# Patient Record
Sex: FEMALE | Race: WHITE | NOT HISPANIC OR LATINO | Employment: FULL TIME | URBAN - NONMETROPOLITAN AREA
[De-identification: names, ages, dates, MRNs, and addresses within clinical notes are randomized per-mention and may not be internally consistent; named-entity substitution may affect disease eponyms.]

---

## 2023-01-18 ENCOUNTER — OFFICE VISIT (OUTPATIENT)
Dept: URGENT CARE | Facility: PHYSICIAN GROUP | Age: 38
End: 2023-01-18
Payer: COMMERCIAL

## 2023-01-18 VITALS
DIASTOLIC BLOOD PRESSURE: 80 MMHG | OXYGEN SATURATION: 99 % | HEART RATE: 103 BPM | BODY MASS INDEX: 30.12 KG/M2 | RESPIRATION RATE: 16 BRPM | HEIGHT: 63 IN | WEIGHT: 170 LBS | SYSTOLIC BLOOD PRESSURE: 112 MMHG | TEMPERATURE: 97.6 F

## 2023-01-18 DIAGNOSIS — M62.838 MUSCLE SPASM OF RIGHT SHOULDER: ICD-10-CM

## 2023-01-18 DIAGNOSIS — S49.92XA INJURY OF LEFT SHOULDER, INITIAL ENCOUNTER: ICD-10-CM
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
## 2023-01-18 PROCEDURE — 99203 OFFICE O/P NEW LOW 30 MIN: CPT | Performed by: NURSE PRACTITIONER

## 2023-01-18 RX ORDER — METHYLPREDNISOLONE 4 MG/1
TABLET ORAL
Qty: 21 TABLET | Refills: 0 | Status: SHIPPED | OUTPATIENT
Start: 2023-01-18

## 2023-01-18 RX ORDER — CYCLOBENZAPRINE HCL 5 MG
5 TABLET ORAL 3 TIMES DAILY PRN
Qty: 30 TABLET | Refills: 0 | Status: SHIPPED | OUTPATIENT
Start: 2023-01-18

## 2023-01-18 ASSESSMENT — ENCOUNTER SYMPTOMS
BRUISES/BLEEDS EASILY: 0
WEAKNESS: 0
FEVER: 0
MYALGIAS: 1
FALLS: 1
TINGLING: 0
NECK PAIN: 1
CARDIOVASCULAR NEGATIVE: 1
CHILLS: 0
RESPIRATORY NEGATIVE: 1
SENSORY CHANGE: 0
BACK PAIN: 0

## 2023-01-18 NOTE — LETTER
January 18, 2023       Patient: Ruby Rosas   YOB: 1985   Date of Visit: 1/18/2023         To Whom It May Concern:    In my medical opinion, I recommend that Ruby Rosas be excused from work today through Friday, January 20, 2023 she was evaluated in clinic today.    If you have any questions or concerns, please don't hesitate to call [phone]          Sincerely,          AXEL Tena.  Electronically Signed

## 2023-01-18 NOTE — PROGRESS NOTES
Subjective     Ruby Rosas is a 37 y.o. female who presents with Fall (X2weeks, R arm injury )            Fall  Pertinent negatives include no fever or tingling. States slipped and fell on ice 2 weeks ago and fell on her right shoulder.  Has been experiencing right shoulder pain and decreased range of motion with movement.  Has been using over-the-counter NSAID/Tylenol as well as ice/heat.  Denies weakness in right arm but does have decreased gripping strength with use.  Experiencing right-sided neck stiffness with movements as well.  Admits to heavy lifting recently to 30 to 40 pounds at work which has exacerbated pain in right shoulder.  No previous right shoulder injury or surgeries.    PMH:  has no past medical history on file.  MEDS:   Current Outpatient Medications:     methylPREDNISolone (MEDROL DOSEPAK) 4 MG Tablet Therapy Pack, Follow schedule on package instructions., Disp: 21 Tablet, Rfl: 0    cyclobenzaprine (FLEXERIL) 5 mg tablet, Take 1 Tablet by mouth 3 times a day as needed for Muscle Spasms. Causes drowsiness, do not drive or work while using. Caution with use with other sedating medications., Disp: 30 Tablet, Rfl: 0  ALLERGIES:   Allergies   Allergen Reactions    Penicillins      SURGHX: History reviewed. No pertinent surgical history.  SOCHX:  reports that she has never smoked. She has never used smokeless tobacco. She reports that she does not drink alcohol and does not use drugs.  FH: Family history was reviewed, no pertinent findings to report      Review of Systems   Constitutional:  Negative for chills, fever and malaise/fatigue.   Respiratory: Negative.     Cardiovascular: Negative.    Musculoskeletal:  Positive for falls, joint pain, myalgias and neck pain. Negative for back pain.   Skin:  Negative for itching and rash.   Neurological:  Negative for tingling, sensory change and weakness.   Endo/Heme/Allergies:  Does not bruise/bleed easily.   All other systems reviewed and are  "negative.           Objective     /80   Pulse (!) 103   Temp 36.4 °C (97.6 °F) (Temporal)   Resp 16   Ht 1.6 m (5' 3\")   Wt 77.1 kg (170 lb)   SpO2 99%   BMI 30.11 kg/m²      Physical Exam  Vitals reviewed.   Constitutional:       General: She is not in acute distress.     Appearance: Normal appearance. She is well-developed. She is not ill-appearing, toxic-appearing or diaphoretic.   HENT:      Head: Normocephalic.   Neck:        Comments: Decreased range of motion to right side of neck due to stiffness/pain on right side of posterior cervical region.  Cardiovascular:      Rate and Rhythm: Normal rate.   Pulmonary:      Effort: Pulmonary effort is normal.   Musculoskeletal:      Cervical back: Spasms and tenderness present. No swelling, edema, deformity, erythema, signs of trauma, lacerations, rigidity, torticollis, bony tenderness or crepitus. Pain with movement and muscular tenderness present. No spinous process tenderness. Decreased range of motion.        Back:       Comments: Tenderness on palpation of posterior as well as anterior right shoulder joint but not at AC joint.  Extensive muscle spasm around right shoulder joint.   Skin:     General: Skin is warm and dry.   Neurological:      Mental Status: She is alert and oriented to person, place, and time.   Psychiatric:         Attention and Perception: Attention normal.         Mood and Affect: Mood normal.         Speech: Speech normal.         Behavior: Behavior normal. Behavior is cooperative.                           Assessment & Plan        1. Injury of left shoulder, initial encounter    - methylPREDNISolone (MEDROL DOSEPAK) 4 MG Tablet Therapy Pack; Follow schedule on package instructions.  Dispense: 21 Tablet; Refill: 0  - Referral to establish with [facility]CP  - Referral to Orthopedics    2. Fall due to slipping on ice or snow, initial encounter    - Referral to establish with [facility] PCP  - Referral to Orthopedics    3. Muscle spasm of " right shoulder    - cyclobenzaprine (FLEXERIL) 5 mg tablet; Take 1 Tablet by mouth 3 times a day as needed for Muscle Spasms. Causes drowsiness, do not drive or work while using. Caution with use with other sedating medications.  Dispense: 30 Tablet; Refill: 0  - Referral to establish with [facility] PCP  - Referral to Orthopedics       -Take over the counter NSAID as needed for back discomfort, avoid any Ibuprofen products with oral steroid use, may use Tylenol as needed for additional pain relief  -May use cool compresses for any swelling /throbbing pain and warm compresses for muscle stiffness   -May perform gentle back muscle stretches as tolerated after warm compresses to maintain mobility, avoid abdominal crunches, heavy lifting or repetitive motions  -May use Flexeril as directed when at home only due to drowsiness  -May apply topical analgesics as needed (preferred lidocaine based topical like salon Pas)  -Perform proper body mechanics with lifting, twisting, bending and walking. Ask for assistance with heavy objects as needed   -Monitor for bowel/urination problems, numbness/tingling in extremities, decreased range of della with ambulation difficulty- need re-evaluation  Patient in need of new primary care provider-referral placed at this time for general health exam  May follow-up with orthopedics as needed

## 2023-09-04 ENCOUNTER — OFFICE VISIT (OUTPATIENT)
Dept: URGENT CARE | Facility: PHYSICIAN GROUP | Age: 38
End: 2023-09-04
Payer: COMMERCIAL

## 2023-09-04 VITALS
TEMPERATURE: 97.4 F | SYSTOLIC BLOOD PRESSURE: 110 MMHG | RESPIRATION RATE: 16 BRPM | WEIGHT: 190 LBS | DIASTOLIC BLOOD PRESSURE: 74 MMHG | HEART RATE: 98 BPM | OXYGEN SATURATION: 99 % | BODY MASS INDEX: 34.96 KG/M2 | HEIGHT: 62 IN

## 2023-09-04 DIAGNOSIS — K04.7 TOOTH INFECTION: ICD-10-CM

## 2023-09-04 LAB
FLUAV RNA SPEC QL NAA+PROBE: NEGATIVE
FLUBV RNA SPEC QL NAA+PROBE: NEGATIVE
RSV RNA SPEC QL NAA+PROBE: NEGATIVE
S PYO DNA SPEC NAA+PROBE: NOT DETECTED
SARS-COV-2 RNA RESP QL NAA+PROBE: NEGATIVE

## 2023-09-04 PROCEDURE — 0241U POCT CEPHEID COV-2, FLU A/B, RSV - PCR: CPT | Performed by: FAMILY MEDICINE

## 2023-09-04 PROCEDURE — 3074F SYST BP LT 130 MM HG: CPT | Performed by: FAMILY MEDICINE

## 2023-09-04 PROCEDURE — 3078F DIAST BP <80 MM HG: CPT | Performed by: FAMILY MEDICINE

## 2023-09-04 PROCEDURE — 99213 OFFICE O/P EST LOW 20 MIN: CPT | Performed by: FAMILY MEDICINE

## 2023-09-04 PROCEDURE — 87651 STREP A DNA AMP PROBE: CPT | Performed by: FAMILY MEDICINE

## 2023-09-04 RX ORDER — CLINDAMYCIN HYDROCHLORIDE 300 MG/1
300 CAPSULE ORAL 3 TIMES DAILY
Qty: 21 CAPSULE | Refills: 0 | Status: SHIPPED | OUTPATIENT
Start: 2023-09-04 | End: 2023-09-11

## 2023-09-04 NOTE — PROGRESS NOTES
Chief Complaint   Patient presents with    Oral Swelling     X 1 week top r molar in back with migraine, R ear pressure, R sore throat, congestion, chills.  Pt. Wants a Covid test.           Subjective:      Chief Complaint   Patient presents with    Oral Swelling     X 1 week top r molar in back with migraine, R ear pressure, R sore throat, congestion, chills.  Pt. Wants a Covid test.                Dental Pain   This is a new problem. The current episode started in the past 7 days. The problem occurs constantly (constant, throbbing) over upper rt molar. The problem has been worsening.  + tactile fever      Pertinent negatives include no chills, congestion, fatigue,  , nausea, visual change or vomiting. Chewing aggravates the symptoms. Pt has tried tylenol for the symptoms - no improvement.         Social History     Tobacco Use    Smoking status: Every Day     Types: Cigarettes    Smokeless tobacco: Never   Vaping Use    Vaping Use: Every day    Substances: Nicotine, Flavoring    Devices: Disposable   Substance Use Topics    Alcohol use: Never    Drug use: Never         Current Outpatient Medications on File Prior to Visit   Medication Sig Dispense Refill    cyclobenzaprine (FLEXERIL) 10 mg Tab Take 1 Tablet by mouth 1 time a day as needed for Muscle Spasms. 30 Tablet 1    methylPREDNISolone (MEDROL DOSEPAK) 4 MG Tablet Therapy Pack Follow schedule on package instructions. (Patient not taking: Reported on 9/4/2023) 21 Tablet 0    cyclobenzaprine (FLEXERIL) 5 mg tablet Take 1 Tablet by mouth 3 times a day as needed for Muscle Spasms. Causes drowsiness, do not drive or work while using. Caution with use with other sedating medications. (Patient not taking: Reported on 9/4/2023) 30 Tablet 0     No current facility-administered medications on file prior to visit.         No past medical history on file.              Objective:     /74   Pulse 98   Temp 36.3 °C (97.4 °F) (Temporal)   Resp 16   Ht 1.575 m  "(5' 2\")   Wt 86.2 kg (190 lb)   SpO2 99%     Physical Exam   Constitutional: pt is oriented to person, place, and time. Pt appears well-developed. No distress.   HENT:   Head: Normocephalic and atraumatic.   Mouth/Throat:   Airway patent , no edema  Gingival swelling and tenderness around 2nd   Upper        Right  molar  Eyes: Conjunctivae are normal. Pupils are equal, round, and reactive to light. No scleral icterus.   Cardiovascular: Normal rate and regular rhythm.    Pulmonary/Chest: Effort normal and breath sounds normal. No respiratory distress. Pt has no wheezes.   Neurological: pt is alert and oriented to person, place, and time. No cranial nerve deficit.   Skin: Skin is warm. He is not diaphoretic. No erythema.   Psychiatric:  behavior is normal.   Nursing note and vitals reviewed.         Rapid strep, flu , covid all negative     Assessment/Plan:         1. Tooth infection   PCN allergic    Rx clindamycin      Advised f/u with dentist asap      Differential diagnosis, natural history, supportive care, and indications for immediate follow-up discussed. All questions answered. Patient agrees with the plan of care.     Follow-up as needed if symptoms worsen or fail to improve to PCP, Urgent care or Emergency Room.     I have personally reviewed prior external notes and test results pertinent to today's visit.  I have independently reviewed and interpreted all diagnostics ordered during this urgent care acute visit.      - clindamycin (CLEOCIN) 300 MG Cap; Take 1 Capsule by mouth 3 times a day for 7 days.  Dispense: 21 Capsule; Refill: 0    -       "

## 2024-04-01 ENCOUNTER — APPOINTMENT (OUTPATIENT)
Dept: URGENT CARE | Facility: PHYSICIAN GROUP | Age: 39
End: 2024-04-01
Payer: COMMERCIAL

## 2024-04-01 ENCOUNTER — OCCUPATIONAL MEDICINE (OUTPATIENT)
Dept: URGENT CARE | Facility: PHYSICIAN GROUP | Age: 39
End: 2024-04-01
Payer: COMMERCIAL

## 2024-04-01 VITALS
BODY MASS INDEX: 34.34 KG/M2 | OXYGEN SATURATION: 98 % | HEIGHT: 62 IN | WEIGHT: 186.6 LBS | SYSTOLIC BLOOD PRESSURE: 118 MMHG | HEART RATE: 89 BPM | DIASTOLIC BLOOD PRESSURE: 70 MMHG | RESPIRATION RATE: 14 BRPM | TEMPERATURE: 97.8 F

## 2024-04-01 DIAGNOSIS — T23.162A SUPERFICIAL BURN OF BACK OF LEFT HAND, INITIAL ENCOUNTER: ICD-10-CM

## 2024-04-01 PROCEDURE — 3074F SYST BP LT 130 MM HG: CPT | Performed by: PHYSICIAN ASSISTANT

## 2024-04-01 PROCEDURE — 90715 TDAP VACCINE 7 YRS/> IM: CPT | Performed by: PHYSICIAN ASSISTANT

## 2024-04-01 PROCEDURE — 99213 OFFICE O/P EST LOW 20 MIN: CPT | Mod: 25 | Performed by: PHYSICIAN ASSISTANT

## 2024-04-01 PROCEDURE — 90471 IMMUNIZATION ADMIN: CPT | Performed by: PHYSICIAN ASSISTANT

## 2024-04-01 PROCEDURE — 3078F DIAST BP <80 MM HG: CPT | Performed by: PHYSICIAN ASSISTANT

## 2024-04-01 RX ORDER — CEPHALEXIN 500 MG/1
500 CAPSULE ORAL 4 TIMES DAILY
Qty: 20 CAPSULE | Refills: 0 | Status: SHIPPED | OUTPATIENT
Start: 2024-04-01 | End: 2024-04-06

## 2024-04-01 NOTE — LETTER
[address] [place] [address]  Phone:  [phone] - Fax:  [phone]   [facility] Progress Report and Disability Certification  Date of Service: 4/1/2024   No Show:  No  Date / Time of Next Visit:  Thursday   Claim Information   Patient Name: Shea Rosas  Claim Number:     Employer: [employer] ( [employer]) Date of Injury: 3/26/2024     Insurer / TPA: [employer]  ID / SSN:     Occupation: Shift Lead at [employer]  Diagnosis: The encounter diagnosis was Superficial burn of back of left hand, initial encounter.    Medical Information   Related to Industrial Injury? Yes    Subjective Complaints:  DOI: 3/26/2024.  Superficial burn to the dorsum of the left hand obtained while she was cleaning up grease from a grill.  She has been using basic wound care, topical treatment and burn cream.  Over the last few days she has noticed a slight amount of surrounding erythema.  Interestingly, pain and swelling have significantly improved.  There is no joint pain, open lesions or discharge.  Tetanus out of date.  No previous injury.  No second job.   Objective Findings: Superficial well-healing burn to the dorsum of the left hand at the base of the fourth and fifth digit.  Wound is scabbed over.  A well-demarcated area of surrounding erythema noted but no red streaking.  No tenderness, edema, open lesions or discharge.   Pre-Existing Condition(s): None   Assessment:   Initial Visit    Status: Additional Care Required  Permanent Disability:No    Plan: Medication    Diagnostics:      Comments:       Disability Information   Status: Released to Full Duty    From:     Through:   Restrictions are:     Physical Restrictions   Sitting:    Standing:    Stooping:    Bending:      Squatting:    Walking:    Climbing:    Pushing:      Pulling:    Other:    Reaching Above Shoulder (L):   Reaching Above Shoulder (R):       Reaching Below Shoulder (L):    Reaching  Below Shoulder (R):      Not to exceed Weight Limits   Carrying(hrs):   Weight Limit(lb):   Lifting(hrs):   Weight  Limit(lb):     Comments:      Repetitive Actions   Hands: i.e. Fine Manipulations from Grasping:     Feet: i.e. Operating Foot Controls:     Driving / Operate Machinery:     Health Care Provider’s Original or Electronic Signature  Aric Oviedo P.A.-C. Health Care Provider’s Original or Electronic Signature    Otto Pratt DO MPH     Clinic Name / Location: [address] [address] Clinic Phone Number: Dept: [phone]   Appointment Time: 1:15 Pm Visit Start Time: 2:37 PM   Check-In Time:  1:32 Pm Visit Discharge Time: 2:55 PM    Original-Treating Physician or Chiropractor    Page 2-Insurer/TPA    Page 3-Employer    Page 4-Employee

## 2024-04-01 NOTE — PROGRESS NOTES
"Shira Rosas is a very pleasant 38 y.o. female who presents with Other (New W/C DOI 3/26/24 L hand )      DOI: 3/26/2024.  Superficial burn to the dorsum of the left hand obtained while she was cleaning up grease from a grill.  She has been using basic wound care, topical treatment and burn cream.  Over the last few days she has noticed a slight amount of surrounding erythema.  Interestingly, pain and swelling have significantly improved.  There is no joint pain, open lesions or discharge.  Tetanus out of date.  No previous injury.  No second job.     Other        ROS           Objective     /70   Pulse 89   Temp 36.6 °C (97.8 °F) (Temporal)   Resp 14   Ht 1.575 m (5' 2\")   Wt 84.6 kg (186 lb 9.6 oz)   SpO2 98%   BMI 34.13 kg/m²      Physical Exam    Superficial well-healing burn to the dorsum of the left hand at the base of the fourth and fifth digit.  Wound is scabbed over.  A well-demarcated area of surrounding erythema noted but no red streaking.  No tenderness, edema, open lesions or discharge.                   Assessment & Plan         1. Superficial burn of back of left hand, initial encounter  cephALEXin (KEFLEX) 500 MG Cap    Tdap =>6yo IM        No obvious signs of infection however contingent antibiotic prescription given to start at prediscussed symptoms or changes  Follow-up 3 days  Wound care discussed  Tetanus booster      Return to clinic or go to ED if symptoms worsen or persist. Red flag symptoms and indications for ED discussed at length. Patient voices understanding. All side effects and potential interactions of medication discussed including allergic response, GI upset, sedation, etc.    Please note that this dictation was created using voice recognition software. I have made every reasonable attempt to correct obvious errors, but I expect that there are errors of grammar and possibly content that I did not discover before finalizing the note.          "

## 2024-04-01 NOTE — LETTER
"    EMPLOYEE’S CLAIM FOR COMPENSATION/ REPORT OF INITIAL TREATMENT  FORM C-4  PLEASE TYPE OR PRINT    EMPLOYEE’S CLAIM - PROVIDE ALL INFORMATION REQUESTED   First Name                    DONATE Valdivia Last Name  Ralph Birthdate                    1985                Sex  []M  []F Claim Number (Insurer’s Use Only)     Home Address  [address] Age  38 y.o. 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 (if applicable)  cleaning grease off grill    How did this injury or occupational disease occur? (Be specific and answer in detail. Use additional sheet if necessary)  I was cleaning the grill of bulit up grease and one of the pads was loose and drooping when I passed my hand under it the bottom edge caught on my hand and burnt it.   If you believe that you have an occupational disease, when did you first have knowledge of the disability and its relationship to your employment?  n/a Witnesses to the Accident (if applicable)  Akua Galeas      Nature of Injury or Occupational " Disease  Workers' Compensation  Part(s) of Body Injured or Affected  Hand (L) N/A N/A    I CERTIFY THAT THE ABOVE IS TRUE AND CORRECT TO T HE BEST OF MY KNOWLEDGE AND THAT I HAVE PROVIDED THIS INFORMATION IN ORDER TO OBTAIN THE BENEFITS OF [place], OR [place]).  I HEREBY AUTHORIZE ANY PHYSICIAN, CHIROPRACTOR, SURGEON, PRACTITIONER OR ANY OTHER PERSON, ANY HOSPITAL, INCLUDING [facility] OR [facility], ANY  MEDICAL SERVICE ORGANIZATION, ANY INSURANCE COMPANY, OR OTHER INSTITUTION OR ORGANIZATION TO RELEASE TO EACH OTHER, ANY MEDICAL OR OTHER INFORMATION, INCLUDING BENEFITS PAID OR PAYABLE, PERTINENT TO THIS INJURY OR DISEASE, EXCEPT INFORMATION RELATIVE TO DIAGNOSIS, TREATMENT AND/OR COUNSELING FOR AIDS, PSYCHOLOGICAL CONDITIONS, ALCOHOL OR CONTROLLED SUBSTANCES, FOR WHICH I MUST GIVE SPECIFIC AUTHORIZATION.  A PHOTOSTAT OF THIS AUTHORIZATION SHALL BE VALID AS THE ORIGINAL.     Date 04/01/2024   [facility] Employee’s Original or  *Electronic Signature   THIS REPORT MUST BE COMPLETED AND MAILED WITHIN 3 WORKING DAYS OF TREATMENT   [facility]    Name of Facility  [place]   Date 4/1/2024 Diagnosis and Description of Injury or Occupational Disease  (T23.162A) Superficial burn of back of left hand, initial encounter  The encounter diagnosis was Superficial burn of back of left hand, initial encounter. Is there evidence that the injured employee was under the influence of alcohol and/or another controlled substance at the time of accident?  []No  [] Yes (if yes, please explain)   Hour 2:37 PM  No   Treatment: Superficial burn.  No obvious signs of infection.  Contingent antibiotic prescription given to start at prediscussed symptoms.  Wound care discussed follow-up in 3 days  Tetanus booster    Have you advised the patient to remain off work five days or more?   [] Yes  Indicate dates: From   To    []No If no, is the injured employee capable of: [] full duty [] modified duty                                                             Yes     If modified duty, specify any limitations / restrictions:                                                                                                                                                                                                                                                                                                                                                                                                                  X-Ray Findings:      From information given by the employee, together with medical evidence, can you directly connect this injury or occupational disease as job incurred?  []Yes   [] No Yes    Is additional medical care by a physician indicated? []Yes [] No  Yes    Do you know of any previous injury or disease contributing to this condition or occupational disease? []Yes [] No (Explain if yes)                          No   Date  4/1/2024 Print Health Care Provider’s Name  Dee Oviedo P.A.-C. I certify that the employer’s copy of  this form was delivered to the employer on:   Address  [address] INSURER'S USE ONLY                       [place] [facility]  [address] Provider’s Tax ID Number  [id number]   Telephone  Dept: [phone]    Health Care Provider’s Original or Electronic Signature  e-DEE Tolentino P.A.-C. Degree (MD,DO, DC,PA-C,APRN)    Choose (if applicable)      ORIGINAL - TREATING HEALTHCARE PROVIDER PAGE 2 - INSURER/TPA PAGE 3 - EMPLOYER PAGE 4 - EMPLOYEE             Form C-4 (rev.08/23)        BRIEF DESCRIPTION OF RIGHTS AND BENEFITS  (Pursuant to [place] 616C.050)    Notice of Injury or Occupational Disease (Incident Report Form C-1): If an injury or occupational disease (OD) arises out of and in the course of employment, you must provide written  "notice to your employer as soon as practicable, but no later than 7 days after the accident or OD. Your employer shall maintain a sufficient supply of the required forms.    Claim for Compensation (Form C-4): If medical treatment is sought, the form C-4 is available at the place of initial treatment. A completed \"Claim for Compensation\" (Form C-4) must be filed within 90 days after an accident or OD. The treating physician or chiropractor must, within 3 working days after treatment, complete and mail to the employer, the employer's insurer and third-party , the Claim for Compensation.    Medical Treatment: If you require medical treatment for your on-the-job injury or OD, you may be required to select a physician or chiropractor from a list provided by your workers’ compensation insurer, if it has contracted with an Organization for Managed Care (MCO) or Preferred Provider Organization (PPO) or providers of health care. If your employer has not entered into a contract with an MCO or PPO, you may select a physician or chiropractor from the Panel of Physicians and Chiropractors. Any medical costs related to your industrial injury or OD will be paid by your insurer.    Temporary Total Disability (TTD): If your doctor has certified that you are unable to work for a period of at least 5 consecutive days, or 5 cumulative days in a 20-day period, or places restrictions on you that your employer does not accommodate, you may be entitled to TTD compensation.    Temporary Partial Disability (TPD): If the wage you receive upon reemployment is less than the compensation for TTD to which you are entitled, the insurer may be required to pay you TPD compensation to make up the difference. TPD can only be paid for a maximum of 24 months.    Permanent Partial Disability (PPD): When your medical condition is stable and there is an indication of a PPD as a result of your injury or OD, within 30 days, your insurer must " arrange for an evaluation by a rating physician or chiropractor to determine the degree of your PPD. The amount of your PPD award depends on the date of injury, the results of the PPD evaluation, your age and wage.    Permanent Total Disability (PTD): If you are medically certified by a treating physician or chiropractor as permanently and totally disabled and have been granted a PTD status by your insurer, you are entitled to receive monthly benefits not to exceed 66 2/3% of your average monthly wage. The amount of your PTD payments is subject to reduction if you previously received a lump-sum PPD award.    Vocational Rehabilitation Services: You may be eligible for vocational rehabilitation services if you are unable to return to the job due to a permanent physical impairment or permanent restrictions as a result of your injury or occupational disease.    Transportation and Per Mely Reimbursement: You may be eligible for travel expenses and per mely associated with medical treatment.    Reopening: You may be able to reopen your claim if your condition worsens after claim closure.     Appeal Process: If you disagree with a written determination issued by the insurer or the insurer does not respond to your request, you may appeal to the Department of Administration, , by following the instructions contained in your determination letter. You must appeal the determination within 70 days from the date of the determination letter at [address], [address], [place], [place] [address], or [address] [facility] [address], [place] [address]. If you disagree with the  decision, you may appeal to the Department of Administration, . You must file your appeal within 30 days from the date of the  decision letter at [address], [address], [place], [place] [address], or [address], [place], [place] [address]. If you disagree  with a decision of an , you may file a petition for judicial review with the District Court. You must do so within 30 days of the Appeal Officer’s decision. You may be represented by an  at your own expense or you may contact the [facility] for possible representation.    [place]  for Injured Workers (NAIW): If you disagree with a  decision, you may request that NAIW represent you without charge at an  Hearing. For information regarding denial of benefits, you may contact the [facility] at: [address], [address], [place] [address], [phone], or [address] [address], [phone]    To File a Complaint with the Division: If you wish to file a complaint with the  of the Division of Industrial Relations (DIR),  please contact the Workers’ Compensation Section, [address], [address], [place], [place] [address], telephone [phone], or [address], [address], [place], [place] [address], telephone [phone].    For assistance with Workers’ Compensation Issues: You may contact the [facility] Central Office for Consumer Health Assistance, [address], [address], [place], [place] [address], Toll Free 1-263.656.5168, Web site: http://[facility].[place].gov/Programs/BERNARD E-mail: bernard@[facility].nv.[facility]              __________________________________________________________________                                    04/01/2024            Employee Name / Signature                                                                                                                            Date                                                                                                                                                                                                                              D-2 (rev. 10/20)

## 2024-04-04 ENCOUNTER — OCCUPATIONAL MEDICINE (OUTPATIENT)
Dept: URGENT CARE | Facility: PHYSICIAN GROUP | Age: 39
End: 2024-04-04
Payer: COMMERCIAL

## 2024-04-04 VITALS
HEART RATE: 82 BPM | HEIGHT: 63 IN | OXYGEN SATURATION: 96 % | TEMPERATURE: 98.5 F | RESPIRATION RATE: 18 BRPM | SYSTOLIC BLOOD PRESSURE: 112 MMHG | DIASTOLIC BLOOD PRESSURE: 70 MMHG | WEIGHT: 188 LBS | BODY MASS INDEX: 33.31 KG/M2

## 2024-04-04 DIAGNOSIS — T23.162A SUPERFICIAL BURN OF BACK OF LEFT HAND, INITIAL ENCOUNTER: ICD-10-CM

## 2024-04-04 PROCEDURE — 3074F SYST BP LT 130 MM HG: CPT | Performed by: FAMILY MEDICINE

## 2024-04-04 PROCEDURE — 99213 OFFICE O/P EST LOW 20 MIN: CPT | Performed by: FAMILY MEDICINE

## 2024-04-04 PROCEDURE — 3078F DIAST BP <80 MM HG: CPT | Performed by: FAMILY MEDICINE

## 2024-04-04 PROCEDURE — 1125F AMNT PAIN NOTED PAIN PRSNT: CPT | Performed by: FAMILY MEDICINE

## 2024-04-04 ASSESSMENT — PAIN SCALES - GENERAL: PAINLEVEL: 7=MODERATE-SEVERE PAIN

## 2024-04-04 NOTE — PROGRESS NOTES
"SUBJECTIVE      Chief Complaint   Patient presents with    Other     WC F/V burned L hand DOI 3/26/24             DOI:   26 MARCH      S/p partial thickness burn dorsum left hand      Denies fever or d/c      Has been applying polysporin daily             History   Substance Use Topics    Smoking status: Never Smoker     Smokeless tobacco: No     Alcohol Use: No      Past medical history was unremarkable and not pertinent to current issue      Family History   Problem Relation Age of Onset    Diabetes Father          Review of Systems   Constitutional: Negative for fever and fatigue.   HENT: Negative for congestion.    Respiratory: Negative for cough and shortness of breath.    Cardiovascular: Negative for chest pain.   Gastrointestinal: Negative for abdominal pain.   Skin: Positive for rash.   Neurological: Negative for dizziness.   All other systems reviewed and are negative.         Objective:     /70   Pulse 82   Temp 36.9 °C (98.5 °F) (Temporal)   Resp 18   Ht 1.6 m (5' 3\")   Wt 85.3 kg (188 lb)   SpO2 96%       Physical Exam   Constitutional: pt is oriented to person, place, and time. Pt appears well-developed and well-nourished. No distress.   HENT:   Head: Normocephalic and atraumatic.   Eyes: Conjunctivae are normal. No scleral icterus.   Cardiovascular: Normal rate and regular rhythm.    Pulmonary/Chest: Effort normal and breath sounds normal. No respiratory distress.        Neurological: pt is alert and oriented to person, place, and time. No cranial nerve deficit.   Skin: Skin is warm. Pt is not diaphoretic.      Area of erythema, warmth, tender to touch over     Left hand:   full AROM.   Neurovasc intact.     There is a 3cm burn eschar without discharge or surrounding erythema.         Nursing note and vitals reviewed.              Assessment/Plan:      1. Superficial burn of back of left hand     No signs of wound infection  Continue polysporin daily    Cleared for full duty    F/u one " wk

## 2024-04-04 NOTE — LETTER
[address] [place] [address]  Phone:  [phone] - Fax:  [phone]   [facility] Progress Report and Disability Certification  Date of Service: 4/4/2024   No Show:  No  Date / Time of Next Visit:     Claim Information   Patient Name: Shea Rosas  Claim Number:     Employer:  [employer]  Date of Injury: 3/26/2024     Insurer / TPA: [facility]  ID / SSN:     Occupation: Shift Lead at [employer]  Diagnosis: The encounter diagnosis was Superficial burn of back of left hand, initial encounter.    Medical Information   Related to Industrial Injury? Yes    Subjective Complaints:        DOI:   26 MARCH      S/p partial thickness burn dorsum left hand      Denies fever or d/c      Has been applying polysporin daily       Objective Findings: Left hand:   full AROM.   Neurovasc intact.     There is a 3cm burn eschar without discharge or surrounding erythema.          Pre-Existing Condition(s):     Assessment:   Condition Improved    Status: Additional Care Required  Permanent Disability:No    Plan:      Diagnostics:      Comments:       Disability Information   Status: Released to Full Duty    From:     Through:   Restrictions are:     Physical Restrictions   Sitting:    Standing:    Stooping:    Bending:      Squatting:    Walking:    Climbing:    Pushing:      Pulling:    Other:    Reaching Above Shoulder (L):   Reaching Above Shoulder (R):       Reaching Below Shoulder (L):    Reaching Below Shoulder (R):      Not to exceed Weight Limits   Carrying(hrs):   Weight Limit(lb):   Lifting(hrs):   Weight  Limit(lb):     Comments: Superficial burn of back of left hand     No signs of wound infection  Continue polysporin daily    Cleared for full duty    F/u one wk    Repetitive Actions   Hands: i.e. Fine Manipulations from Grasping:     Feet: i.e. Operating Foot Controls:     Driving / Operate Machinery:     Health Care Provider’s Original or  Electronic Signature  Jabier Davis M.D. Health Care Provider’s Original or Electronic Signature    Otto Pratt DO MPH     Clinic Name / Location: [address] [address] Clinic Phone Number: Dept: [phone]   Appointment Time: 10:00 Am Visit Start Time: 10:19 AM   Check-In Time:  9:43 Am Visit Discharge Time: 10:34 AM    Original-Treating Physician or Chiropractor    Page 2-Insurer/TPA    Page 3-Employer    Page 4-Employee

## 2024-04-11 ENCOUNTER — APPOINTMENT (OUTPATIENT)
Dept: URGENT CARE | Facility: PHYSICIAN GROUP | Age: 39
End: 2024-04-11
Payer: COMMERCIAL

## 2024-04-12 ENCOUNTER — OCCUPATIONAL MEDICINE (OUTPATIENT)
Dept: URGENT CARE | Facility: PHYSICIAN GROUP | Age: 39
End: 2024-04-12
Payer: COMMERCIAL

## 2024-04-12 VITALS
OXYGEN SATURATION: 96 % | HEART RATE: 81 BPM | SYSTOLIC BLOOD PRESSURE: 116 MMHG | DIASTOLIC BLOOD PRESSURE: 64 MMHG | BODY MASS INDEX: 33.95 KG/M2 | HEIGHT: 63 IN | RESPIRATION RATE: 20 BRPM | WEIGHT: 191.6 LBS | TEMPERATURE: 97.3 F

## 2024-04-12 DIAGNOSIS — T23.102D: ICD-10-CM

## 2024-04-12 PROCEDURE — 3074F SYST BP LT 130 MM HG: CPT | Performed by: NURSE PRACTITIONER

## 2024-04-12 PROCEDURE — 3078F DIAST BP <80 MM HG: CPT | Performed by: NURSE PRACTITIONER

## 2024-04-12 PROCEDURE — 99213 OFFICE O/P EST LOW 20 MIN: CPT | Performed by: NURSE PRACTITIONER

## 2024-04-12 NOTE — LETTER
PHYSICIAN’S AND CHIROPRACTIC PHYSICIAN'S                       PROGRESS REPORT  CERTIFICATION OF DISABILITY Claim Number:        Social Security Number:     Patient’s Name:Shea Rosas Date of Injury:  3/26/2024     Employer:   [employer]  Name of O (if applicable)   Patient’s Job Description/Occupation:  Shift Lead at [employer]    Previous Injuries/Diseases/Surgeries Contributing to the Condition:      Diagnosis:  The encounter diagnosis was Superficial burn of left hand, subsequent encounter.   Related to the Industrial Injury? Explain:  Yes    Objective Medical Findings:     Comments: Scab in place to left dorsal hand.  Negative for swelling or surrounding erythema.  No active drainage or bleeding.       []  None - Discharged                         Stable     [x]  Yes     []  No                           Ratable     []  Yes     []  No   [x]  Generally Improved                        []  Condition Worsened                              []  Condition Same         May Have Suffered a Permanent Disability     []  Yes     []  No   Treatment Plan:       [] No Change in Therapy                    [] PT/OT Prescribed                   [] Medication May be Used While Working    [] Case Management                          [] PT/OT Discontinued  []  Consultation    [] Further Diagnostic Studies    []  Prescription(s)                        [] Released to FULL DUTY /No Restrictions on (Date):                                                                                           [] Certified TOTALLY TEMPORARILY DISABLED (Indicate Dates): From:                       To:                               [] Released to RESTRICTED/Modified Duty on (Date): From:                              To:                                                                   Restrictions Are:     []  Permanent[]  Temporary   [] No Sitting                   []  No Standing          []  No Pulling              []  Other:                                              [] No Bending at Waist  []  No Stooping          []  No Lifting                                                                            [] No Carrying                []  No Walking           []  Lifting Restricted to (lbs.):   [] No Pushing                 []  No Climbing         []  No Reaching Above Shoulders   Date of Next Visit:   Date of this Exam:  4/12/2024 Physician/Chiropractic Physician Name: JUNG Griffith Physician/Chiropractic Physician Signature:   Otto Pratt DO MPH   D-39 (Rev. 2/24)

## 2024-04-12 NOTE — PROGRESS NOTES
"Subjective:     Shea Rosas is a 38 y.o. female who presents for Follow-Up (Left hand WC follow up - burn, pt sts it is healing well )      HPI  DOI: 3/26/2024.  Superficial burn to the dorsum of the left hand obtained while she was cleaning up grease from a grill.  She has been using basic wound care, topical treatment and burn cream.  Over the last few days she has noticed a slight amount of surrounding erythema.  Interestingly, pain and swelling have significantly improved.  There is no joint pain, open lesions or discharge.  Tetanus out of date.  No previous injury.  No second job.     Follow-up visit #1.  4/4/2024.  S/p partial thickness burn dorsum left hand  Denies fever or d/c  Has been applying polysporin daily    Follow-up visit #2.  4/12/2024.  Wound is healing well.  Patient denies any pain at this time.  Scab remains in place.  No work restrictions needed at this time.    ROS    PMH: No past medical history on file.  ALLERGIES:   Allergies   Allergen Reactions    Penicillins      SURGHX: No past surgical history on file.  SOCHX:   Social History     Socioeconomic History    Marital status:    Tobacco Use    Smoking status: Every Day     Current packs/day: 0.50     Types: Cigarettes    Smokeless tobacco: Never   Vaping Use    Vaping Use: Every day    Substances: Nicotine, Flavoring    Devices: Disposable   Substance and Sexual Activity    Alcohol use: Never    Drug use: Yes     Types: Marijuana     Comment: occ     FH:   Family History   Problem Relation Age of Onset    Diabetes Father          Objective:   /64 (BP Location: Left arm, Patient Position: Sitting, BP Cuff Size: Adult)   Pulse 81   Temp 36.3 °C (97.3 °F) (Temporal)   Resp 20   Ht 1.6 m (5' 3\")   Wt 86.9 kg (191 lb 9.6 oz)   SpO2 96%   BMI 33.94 kg/m²     Physical Exam  Vitals and nursing note reviewed.   Constitutional:       General: She is not in acute distress.     Appearance: Normal appearance. She is normal " weight. She is not ill-appearing or toxic-appearing.   HENT:      Head: Normocephalic.      Right Ear: External ear normal.      Left Ear: External ear normal.      Nose: No congestion or rhinorrhea.      Mouth/Throat:      Pharynx: No oropharyngeal exudate or posterior oropharyngeal erythema.   Eyes:      General:         Right eye: No discharge.         Left eye: No discharge.      Pupils: Pupils are equal, round, and reactive to light.   Pulmonary:      Effort: Pulmonary effort is normal.   Abdominal:      General: Abdomen is flat.   Musculoskeletal:         General: Normal range of motion.      Cervical back: Normal range of motion and neck supple.   Skin:     General: Skin is dry.      Comments: Scab in place to left dorsal hand.  Negative for swelling or surrounding erythema.  No active drainage or bleeding.   Neurological:      General: No focal deficit present.      Mental Status: She is alert and oriented to person, place, and time. Mental status is at baseline.   Psychiatric:         Mood and Affect: Mood normal.         Behavior: Behavior normal.         Thought Content: Thought content normal.         Judgment: Judgment normal.         Assessment/Plan:   Assessment    1. Superficial burn of left hand, subsequent encounter          No work restrictions needed at this time.  Patient to follow-up in 7 days for further evaluation of wound healing.  She may return sooner for signs and symptoms of infection.

## 2024-04-19 ENCOUNTER — OCCUPATIONAL MEDICINE (OUTPATIENT)
Dept: URGENT CARE | Facility: PHYSICIAN GROUP | Age: 39
End: 2024-04-19
Payer: COMMERCIAL

## 2024-04-19 VITALS
HEIGHT: 63 IN | BODY MASS INDEX: 33.84 KG/M2 | OXYGEN SATURATION: 98 % | SYSTOLIC BLOOD PRESSURE: 114 MMHG | WEIGHT: 191 LBS | DIASTOLIC BLOOD PRESSURE: 80 MMHG | HEART RATE: 96 BPM | TEMPERATURE: 98 F | RESPIRATION RATE: 14 BRPM

## 2024-04-19 DIAGNOSIS — T23.102D: ICD-10-CM

## 2024-04-19 PROCEDURE — 3074F SYST BP LT 130 MM HG: CPT | Performed by: FAMILY MEDICINE

## 2024-04-19 PROCEDURE — 99213 OFFICE O/P EST LOW 20 MIN: CPT | Performed by: FAMILY MEDICINE

## 2024-04-19 PROCEDURE — 3079F DIAST BP 80-89 MM HG: CPT | Performed by: FAMILY MEDICINE

## 2024-04-19 NOTE — LETTER
PHYSICIAN’S AND CHIROPRACTIC PHYSICIAN'S                       PROGRESS REPORT  CERTIFICATION OF DISABILITY Claim Number:        Social Security Number:     Patient’s Name:Shea Rosas Date of Injury:  3/26/2024     Employer:   [employer]  Name of MCO (if applicable)   Patient’s Job Description/Occupation:  Shift Lead at [employer]    Previous Injuries/Diseases/Surgeries Contributing to the Condition:      Diagnosis:  (T23.102D) Superficial burn of left hand, subsequent encounterThe encounter diagnosis was Superficial burn of left hand, subsequent encounter.   Related to the Industrial Injury? Yes   Explain:[unfilled]   Objective Medical Findings: Appropriately healing scab to the back of her left hand.  No surrounding erythema or edema.  Equal strength and sensation to hands bilaterally.    []  None - Discharged                         Stable     []  Yes     []  No                           Ratable     []  Yes     []  No   []  Generally Improved                        []  Condition Worsened                              []  Condition Same         May Have Suffered a Permanent Disability     []  Yes     []  No   Treatment Plan: [unfilled]     [] No Change in Therapy                    [] PT/OT Prescribed                   [] Medication May be Used While Working    [] Case Management                          [] PT/OT Discontinued  []  Consultation    [] Further Diagnostic Studies    []  Prescription(s)                        [x] Released to FULL DUTY /No Restrictions on (Date):                                                                                           [] Certified TOTALLY TEMPORARILY DISABLED (Indicate Dates): From:                       To:                               [] Released to RESTRICTED/Modified Duty on (Date): From:                              To:                                                                   Restrictions Are:      []  Permanent[x]  Temporary   [] No Sitting                   []  No Standing          []  No Pulling             []  Other:                                              [] No Bending at Waist  []  No Stooping          []  No Lifting                                                                            [] No Carrying                []  No Walking           []  Lifting Restricted to (lbs.):   [] No Pushing                 []  No Climbing         []  No Reaching Above Shoulders   Date of Next Visit: 5/3/2024 Date of this Exam:  4/19/2024 Physician/Chiropractic Physician Name: Oneyda Bermudez M.D. Physician/Chiropractic Physician Signature:   Otto Pratt DO MPH   D-39 (Rev. 2/24)

## 2024-04-19 NOTE — PROGRESS NOTES
"  Subjective:     38 y.o. female presents for Work-Related Injury ( [facility] DOI: 03-26-24 L HAND INJURY)      DOI: 3/26/2024  ABRAN: Superficial burn to the dorsum of the left hand that she obtained while she was cleaning up grease from a grill.  She is right-hand dominant.  She denies prior left hand injury.  No secondary employment.    Visit #4 today: Patient reports improvement in symptoms since prior visit, estimates she is 95% of her baseline. She is no longer experiencing any pain to her right hand. She does still have the scab. She is tolerating full duty well.     PMH:   No pertinent past medical history to this problem  MEDS:  Medications were reviewed in EMR  ALLERGIES:  Allergies were reviewed in EMR  FH:   No pertinent family history to this problem     Objective:     /80   Pulse 96   Temp 36.7 °C (98 °F) (Temporal)   Resp 14   Ht 1.6 m (5' 3\")   Wt 86.6 kg (191 lb)   SpO2 98%   BMI 33.83 kg/m²     Appropriately healing scab to the back of her left hand.  No surrounding erythema or edema.  Equal strength and sensation to hands bilaterally.    Assessment/Plan:     1. Superficial burn of left hand, subsequent encounter    Released to Full Duty FROM 4/19/2024 TO 5/3/2024     -Continue full duty  -Tylenol and ibuprofen as needed    Differential diagnosis, natural history, supportive care, and indications for immediate follow-up discussed.  "

## 2024-05-06 ENCOUNTER — OCCUPATIONAL MEDICINE (OUTPATIENT)
Dept: URGENT CARE | Facility: PHYSICIAN GROUP | Age: 39
End: 2024-05-06
Payer: COMMERCIAL

## 2024-05-06 VITALS
SYSTOLIC BLOOD PRESSURE: 110 MMHG | TEMPERATURE: 97.7 F | HEIGHT: 63 IN | OXYGEN SATURATION: 100 % | WEIGHT: 191 LBS | BODY MASS INDEX: 33.84 KG/M2 | DIASTOLIC BLOOD PRESSURE: 68 MMHG | RESPIRATION RATE: 16 BRPM | HEART RATE: 102 BPM

## 2024-05-06 DIAGNOSIS — T23.102D: ICD-10-CM

## 2024-05-06 PROCEDURE — 3078F DIAST BP <80 MM HG: CPT | Performed by: NURSE PRACTITIONER

## 2024-05-06 PROCEDURE — 3074F SYST BP LT 130 MM HG: CPT | Performed by: NURSE PRACTITIONER

## 2024-05-06 PROCEDURE — 99213 OFFICE O/P EST LOW 20 MIN: CPT | Performed by: NURSE PRACTITIONER

## 2024-05-07 NOTE — PROGRESS NOTES
Subjective:     Shea Rosas is a 39 y.o. female who presents for Work-Related Injury ( [facility] DOI: 03-26-24 L HAND INJURY)      HPI  HPI  DOI: 3/26/2024.  Superficial burn to the dorsum of the left hand obtained while she was cleaning up grease from a grill.  She has been using basic wound care, topical treatment and burn cream.  Over the last few days she has noticed a slight amount of surrounding erythema.  Interestingly, pain and swelling have significantly improved.  There is no joint pain, open lesions or discharge.  Tetanus out of date.  No previous injury.  No second job.      Follow-up visit #1.  4/4/2024.  S/p partial thickness burn dorsum left hand  Denies fever or d/c  Has been applying polysporin daily     Follow-up visit #2.  4/12/2024.  Wound is healing well.  Patient denies any pain at this time.  Scab remains in place.  No work restrictions needed at this time.     Visit #4 today: Patient reports improvement in symptoms since prior visit, estimates she is 95% of her baseline. She is no longer experiencing any pain to her right hand. She does still have the scab. She is tolerating full duty well.     Visit 5: Sharon is a very pleasant 39-year-old female presents to urgent care today after suffering a burn of her left hand on 3/26/2024.  Her symptoms have fully resolved at this time.    ROS    PMH: No past medical history on file.  ALLERGIES:   Allergies   Allergen Reactions    Penicillins      SURGHX: No past surgical history on file.  SOCHX:   Social History     Socioeconomic History    Marital status:    Tobacco Use    Smoking status: Every Day     Current packs/day: 0.50     Types: Cigarettes    Smokeless tobacco: Never   Vaping Use    Vaping Use: Every day    Substances: Nicotine, Flavoring    Devices: Disposable   Substance and Sexual Activity    Alcohol use: Never    Drug use: Yes     Types: Marijuana     Comment: occ     FH:   Family History   Problem Relation Age of Onset     "Diabetes Father          Objective:   /68   Pulse (!) 102   Temp 36.5 °C (97.7 °F) (Temporal)   Resp 16   Ht 1.6 m (5' 3\")   Wt 86.6 kg (191 lb)   SpO2 100%   BMI 33.83 kg/m²     Physical Exam  Vitals and nursing note reviewed.   Constitutional:       General: She is not in acute distress.     Appearance: Normal appearance. She is not ill-appearing.   HENT:      Head: Normocephalic and atraumatic.      Right Ear: External ear normal.      Left Ear: External ear normal.      Nose: No congestion or rhinorrhea.      Mouth/Throat:      Mouth: Mucous membranes are moist.   Eyes:      Extraocular Movements: Extraocular movements intact.      Pupils: Pupils are equal, round, and reactive to light.   Cardiovascular:      Rate and Rhythm: Normal rate and regular rhythm.      Pulses: Normal pulses.      Heart sounds: Normal heart sounds.   Pulmonary:      Effort: Pulmonary effort is normal.      Breath sounds: Normal breath sounds.   Abdominal:      General: Abdomen is flat. Bowel sounds are normal.      Palpations: Abdomen is soft.   Musculoskeletal:         General: Normal range of motion.      Cervical back: Normal range of motion and neck supple.   Skin:     General: Skin is warm and dry.      Capillary Refill: Capillary refill takes less than 2 seconds.      Comments: Scarring in place to dorsum of left hand where burn occurred.  No active pain, changes in range of motion or open lesions.   Neurological:      General: No focal deficit present.      Mental Status: She is alert and oriented to person, place, and time. Mental status is at baseline.   Psychiatric:         Mood and Affect: Mood normal.         Behavior: Behavior normal.         Thought Content: Thought content normal.         Judgment: Judgment normal.         Assessment/Plan:   Assessment    1. Superficial burn of left hand, subsequent encounter          Patient cleared to return to work full max duty as symptoms have resolved at this time.  I " did educate her to apply sunscreen to burn to prevent excessive scarring.  She is in agreement with plan of care.  D39 paperwork filled out by hand.

## 2024-12-18 ENCOUNTER — OFFICE VISIT (OUTPATIENT)
Dept: URGENT CARE | Facility: PHYSICIAN GROUP | Age: 39
End: 2024-12-18
Payer: COMMERCIAL

## 2024-12-18 VITALS
TEMPERATURE: 97.7 F | HEIGHT: 62 IN | SYSTOLIC BLOOD PRESSURE: 116 MMHG | BODY MASS INDEX: 33.09 KG/M2 | HEART RATE: 98 BPM | DIASTOLIC BLOOD PRESSURE: 60 MMHG | RESPIRATION RATE: 20 BRPM | OXYGEN SATURATION: 99 % | WEIGHT: 179.8 LBS

## 2024-12-18 DIAGNOSIS — J10.1 INFLUENZA A: ICD-10-CM

## 2024-12-18 LAB
FLUAV RNA SPEC QL NAA+PROBE: POSITIVE
FLUBV RNA SPEC QL NAA+PROBE: NEGATIVE
RSV RNA SPEC QL NAA+PROBE: NEGATIVE
S PYO DNA SPEC NAA+PROBE: NOT DETECTED
SARS-COV-2 RNA RESP QL NAA+PROBE: NEGATIVE

## 2024-12-18 PROCEDURE — 0241U POCT CEPHEID COV-2, FLU A/B, RSV - PCR: CPT | Performed by: FAMILY MEDICINE

## 2024-12-18 PROCEDURE — 99214 OFFICE O/P EST MOD 30 MIN: CPT | Performed by: FAMILY MEDICINE

## 2024-12-18 PROCEDURE — 3074F SYST BP LT 130 MM HG: CPT | Performed by: FAMILY MEDICINE

## 2024-12-18 PROCEDURE — 3078F DIAST BP <80 MM HG: CPT | Performed by: FAMILY MEDICINE

## 2024-12-18 PROCEDURE — 87651 STREP A DNA AMP PROBE: CPT | Performed by: FAMILY MEDICINE

## 2024-12-18 RX ORDER — OSELTAMIVIR PHOSPHATE 75 MG/1
75 CAPSULE ORAL 2 TIMES DAILY
Qty: 10 CAPSULE | Refills: 0 | Status: SHIPPED | OUTPATIENT
Start: 2024-12-18 | End: 2024-12-23

## 2024-12-18 NOTE — PROGRESS NOTES
"Chief Complaint   Patient presents with    Cough     X 3 days     Fever     102.3f last night     Pharyngitis    Nasal Congestion    Body Aches         Cough  This is a new problem. The current episode started yesterday. The problem has been unchanged. The problem occurs constantly. The cough is dry. Associated symptoms include : fatigue, headaches, chills, muscle aches, fever. Pertinent negatives include no   nausea, vomiting, diarrhea, sweats, weight loss or wheezing. Nothing aggravates the symptoms.  Patient has tried nothing for the symptoms. There is no history of asthma.        No past medical history on file.      Social History     Tobacco Use    Smoking status: Every Day     Current packs/day: 0.50     Types: Cigarettes    Smokeless tobacco: Never   Vaping Use    Vaping status: Every Day    Substances: Nicotine, Flavoring    Devices: Disposable   Substance Use Topics    Alcohol use: Never    Drug use: Yes     Types: Marijuana     Comment: occ           Family History   Problem Relation Age of Onset    Diabetes Father                     Review of Systems   Constitutional: positive for fever and chills  HENT: negative for otalgia, sore throat  Cardiovascular - denies chest pain or dyspnea  Respiratory: Positive for cough.  .  Negative for wheezing.    Neurological: Negative for headaches, dizziness   GI - denies nausea, vomiting or diarrhea   - denies dysuria, discharge  Psych - denies depression, anxiety  Neuro - denies numbness or tingling.   10 point ROS otherwise negative, except per HPI             Objective:     /60   Pulse 98   Temp 36.5 °C (97.7 °F) (Temporal)   Resp 20   Ht 1.575 m (5' 2\")   Wt 81.6 kg (179 lb 12.8 oz)   SpO2 99%       Physical Exam   Constitutional: patient is oriented to person, place, and time. Patient appears well-developed and well-nourished. No distress.   HENT:   Head: Normocephalic and atraumatic.   Right Ear: External ear normal.   Left Ear: External ear " normal.   TMs normal  Nose: Mucosal edema  present. Right sinus exhibits no maxillary sinus tenderness. Left sinus exhibits no maxillary sinus tenderness.   Mouth/Throat: Mucous membranes are normal. No oral lesions.  No posterior pharyngeal erythema.  No oropharyngeal exudate or posterior oropharyngeal edema.   Eyes: Conjunctivae and EOM are normal. Pupils are equal, round, and reactive to light. Right eye exhibits no discharge. Left eye exhibits no discharge. No scleral icterus.   Neck: Normal range of motion. Neck supple. No tracheal deviation present.   Cardiovascular: Normal rate, regular rhythm and normal heart sounds.  Exam reveals no friction rub.    Pulmonary/Chest: Effort normal. No respiratory distress. Patient has no wheezes or rhonchi. Patient has no rales.    Musculoskeletal:  exhibits no edema.   Lymphadenopathy:     Patient has no cervical adenopathy.      Neurological: patient is alert and oriented to person, place, and time.   Skin: Skin is warm and dry. No rash noted. No erythema.   Psychiatric: patient  has a normal mood and affect.  behavior is normal.   Nursing note and vitals reviewed.          Assesment/Plan:       Influenza A  PCR-confirmed      - POCT CEPHEID COV-2, FLU A/B, RSV - PCR  - oseltamivir (TAMIFLU) 75 MG Cap; Take 1 Capsule by mouth 2 times a day for 5 days.  Dispense: 10 Capsule; Refill: 0  - POCT CEPHEID GROUP A STREP - PCR        Differential diagnosis, natural history, supportive care, and indications for immediate follow-up discussed. All questions answered. Patient agrees with the plan of care.     Follow-up as needed if symptoms worsen or fail to improve to PCP, Urgent care or Emergency Room.     I have personally reviewed prior external notes and test results pertinent to today's visit.  I have independently reviewed and interpreted all diagnostics ordered during this urgent care acute visit.

## 2025-04-28 ENCOUNTER — OFFICE VISIT (OUTPATIENT)
Dept: URGENT CARE | Facility: CLINIC | Age: 40
End: 2025-04-28
Payer: COMMERCIAL

## 2025-04-28 VITALS
HEART RATE: 80 BPM | HEIGHT: 62 IN | RESPIRATION RATE: 16 BRPM | WEIGHT: 179 LBS | SYSTOLIC BLOOD PRESSURE: 120 MMHG | DIASTOLIC BLOOD PRESSURE: 72 MMHG | OXYGEN SATURATION: 97 % | BODY MASS INDEX: 32.94 KG/M2 | TEMPERATURE: 97.9 F

## 2025-04-28 DIAGNOSIS — K12.30: ICD-10-CM

## 2025-04-28 DIAGNOSIS — T28.0XXA: ICD-10-CM

## 2025-04-28 RX ORDER — CLINDAMYCIN HYDROCHLORIDE 300 MG/1
300 CAPSULE ORAL 3 TIMES DAILY
Qty: 15 CAPSULE | Refills: 0 | Status: SHIPPED | OUTPATIENT
Start: 2025-04-28 | End: 2025-05-03

## 2025-04-28 RX ORDER — LIDOCAINE HYDROCHLORIDE 20 MG/ML
SOLUTION OROPHARYNGEAL
Qty: 200 ML | Refills: 0 | Status: SHIPPED | OUTPATIENT
Start: 2025-04-28

## 2025-04-28 RX ORDER — SULFAMETHOXAZOLE AND TRIMETHOPRIM 800; 160 MG/1; MG/1
1 TABLET ORAL 2 TIMES DAILY
Qty: 10 TABLET | Refills: 0 | Status: SHIPPED | OUTPATIENT
Start: 2025-04-28 | End: 2025-05-03

## 2025-04-28 NOTE — PATIENT INSTRUCTIONS
Thank you for trusting [facility] for your medical care today. You were seen by Moe Goldman MD. We hope that we were able to answer all of your questions, alleviate concerns, and create a plan going forward. If you need anything from us, don't hesitate to reach out.     If you develop any new or worsening symptoms that you believe need urgent or emergent evaluation, be sure to be reevaluated in urgent care or the emergency room.     Moe Goldman MD  Urgent Care

## 2025-04-28 NOTE — Clinical Note
REFERRAL APPROVAL NOTICE         Sent on April 28, 2025                   Shea Rosas  [address] [place] [address]                   Dear Ms. Rosas,    After a careful review of the medical information and benefit coverage, [facility] has processed your referral. See below for additional details.    If applicable, you must be actively enrolled with your insurance for coverage of the authorized service. If you have any questions regarding your coverage, please contact your insurance directly.    REFERRAL INFORMATION   Referral #:  [id number]  Referred-To Provider    Referred-By Provider:  Otolaryngology    Moe Goldman M.D.   [facility]      [address] [place] [address]  [phone] [address] [facility] [place] [address]  [phone]    Referral Start Date:  04/28/2025  Referral End Date:   04/28/2026             SCHEDULING  If you do not already have an appointment, please call [phone] to make an appointment.     MORE INFORMATION  If you do not already have a [employer] account, sign up at: [email or web address]  You can access your medical information, make appointments, see lab results, billing information, and more.  If you have questions regarding this referral, please contact  the [facility] Referrals department at:             [phone]. Monday - Friday 8:00AM - 5:00PM.     Sincerely,    [facility]

## 2025-04-28 NOTE — PROGRESS NOTES
"Urgent Care Visit Note  [facility]    04/28/25    Shea Rosas     [address] [place] [address]     PCP: Pcp Pt States None     Subjective:     Chief Complaint   Patient presents with    Other     Burnt top of mouth and throat face swelling, x 1 week       HPI:  Shea Rosas is a 40 y.o. female who presents for a burn to the top of her oral cavity about 1 week ago. She took some chicken nuggets out of a fryer and put one into her mouth immediately while very hot, causing the burn. Has been having a lot of pain in that location for the last week.     No fever or chills. Has also been having seasonal allergies making her feel as though her breathing isn't at her baseline. No difficulty swallowing but has been hard to eat due to pain.     ROS:  ROS     CURRENT MEDICATIONS:  Current Outpatient Medications   Medication Sig Refill Last Dispense    clindamycin (CLEOCIN) 300 MG Cap Take 1 Capsule by mouth 3 times a day for 5 days. 0 Unknown (outside pharmacy)    lidocaine (XYLOCAINE) 2 % Solution Swish and spit 15 ml of solution every 4-6 hours as needed for oral pain. 0 Unknown (outside pharmacy)    sulfamethoxazole-trimethoprim (BACTRIM DS) 800-160 MG tablet Take 1 Tablet by mouth 2 times a day for 5 days. 0 Unknown (outside pharmacy)       ALLERGIES:   Allergies   Allergen Reactions    Penicillins        PROBLEM LIST:    does not have a problem list on file.    Allergies, Medications, & Tobacco/Substance Use were reconciled by the Medical Assistant and reviewed by myself.     Objective:     /72   Pulse 80   Temp 36.6 °C (97.9 °F)   Resp 16   Ht 1.575 m (5' 2\")   Wt 81.2 kg (179 lb)   SpO2 97%   BMI 32.74 kg/m²     Physical Exam  HENT:      Head: Normocephalic and atraumatic.      Right Ear: External ear normal.      Left Ear: External ear normal.      Nose: Nose normal.      Mouth/Throat:      Mouth: Mucous membranes are moist.      Tonsils: No tonsillar exudate or tonsillar abscesses.        " Comments: Circular area of swelling and erythema in the superior oral cavity with a central area of possible ulceration. Tender to touch. Not indurated or fluctuant feeling. No tonsillar or oropharyngeal swelling or airway compromise. No external face, jaw, or neck swelling or redness.   Eyes:      General:         Right eye: No discharge.         Left eye: No discharge.      Extraocular Movements: Extraocular movements intact.      Pupils: Pupils are equal, round, and reactive to light.   Cardiovascular:      Rate and Rhythm: Normal rate and regular rhythm.      Pulses: Normal pulses.   Pulmonary:      Effort: Pulmonary effort is normal.      Breath sounds: Normal breath sounds. No stridor.   Abdominal:      General: Abdomen is flat.   Musculoskeletal:      Cervical back: No tenderness.   Lymphadenopathy:      Cervical: No cervical adenopathy.   Skin:     General: Skin is warm.      Capillary Refill: Capillary refill takes less than 2 seconds.   Neurological:      Mental Status: She is alert and oriented to person, place, and time.   Psychiatric:         Mood and Affect: Mood normal.         Behavior: Behavior normal.           Lab Results/POC Test Results         Assessment/Plan:     Patient's history and physical exam consistent with:    Assessment & Plan  Burn of oral cavity    Orders:    Referral to ENT    sulfamethoxazole-trimethoprim (BACTRIM DS) 800-160 MG tablet; Take 1 Tablet by mouth 2 times a day for 5 days.    clindamycin (CLEOCIN) 300 MG Cap; Take 1 Capsule by mouth 3 times a day for 5 days.    lidocaine (XYLOCAINE) 2 % Solution; Swish and spit 15 ml of solution every 4-6 hours as needed for oral pain.    Infection of oral mucosa    Orders:    sulfamethoxazole-trimethoprim (BACTRIM DS) 800-160 MG tablet; Take 1 Tablet by mouth 2 times a day for 5 days.    clindamycin (CLEOCIN) 300 MG Cap; Take 1 Capsule by mouth 3 times a day for 5 days.      Area is burned but potentially infected as well. Allergic to  Augmentin so Bactrim and clinda prescribed. Recommend urgent ENT referral to look at burn and help determine further management. I do not believe she needs emergent evaluation at this point as there is no airway compromise and the burn and possible infection appear localized to area in roof of mouth. Given strict ER and return precautions.     Discussed differential diagnosis, management options, risks/benefits, and alternatives to planned treatment. Pt expressed understanding and the treatment plan was agreed upon. Questions were encouraged and answered. Pt encouraged to return to urgent care as needed if new or worsening symptoms or if there is no improvement in condition. Pt educated in red flags and indications to immediately call 911 or present to the Emergency Department. Advised the patient to follow-up with the primary care physician for recheck, reevaluation, and further management.    I personally reviewed prior external notes and test results pertinent to today's visit. I have independently reviewed and interpreted all diagnostics ordered during this visit.    Please note that this dictation was created using voice recognition software. I have made a reasonable attempt to correct obvious errors, but I expect that there are errors of grammar and possibly content that I did not discover before finalizing the note.    This note was electronically signed by Moe Goldman MD

## 2025-04-28 NOTE — LETTER
April 28, 2025    To Whom It May Concern:         This is confirmation that Shea L Ralph attended her scheduled appointment with Moe Goldman M.D. on 4/28/25. Please excuse from work on 4/27/25 through 4/29/25 due to injury.          If you have any questions please do not hesitate to call me at the phone number listed below.    Sincerely,          Moe Goldman M.D.  [phone]